# Patient Record
Sex: MALE | Race: WHITE | NOT HISPANIC OR LATINO | Employment: OTHER | URBAN - METROPOLITAN AREA
[De-identification: names, ages, dates, MRNs, and addresses within clinical notes are randomized per-mention and may not be internally consistent; named-entity substitution may affect disease eponyms.]

---

## 2018-08-10 ENCOUNTER — TELEPHONE (OUTPATIENT)
Dept: UROLOGY | Facility: AMBULATORY SURGERY CENTER | Age: 68
End: 2018-08-10

## 2018-08-10 NOTE — TELEPHONE ENCOUNTER
Reason for appointment/Complaint/Diagnosis : possible kidney stones    Insurance: Medicare & medicaid     History of Cancer? nursing home does not know     Previous urologist?     unknown                   Records requested/where? Patient is at [facility], they will fax over     Outside testing/where? Location Preference for office visit?  [facility]    attention to Shwetha: fax number to [phone]

## 2018-09-10 ENCOUNTER — OFFICE VISIT (OUTPATIENT)
Dept: UROLOGY | Facility: AMBULATORY SURGERY CENTER | Age: 68
End: 2018-09-10
Payer: MEDICARE

## 2018-09-10 DIAGNOSIS — N20.0 CALCULUS OF KIDNEY: Primary | ICD-10-CM

## 2018-09-10 PROCEDURE — 99204 OFFICE O/P NEW MOD 45 MIN: CPT | Performed by: UROLOGY

## 2018-09-10 RX ORDER — LISINOPRIL 20 MG/1
20 TABLET ORAL DAILY
COMMUNITY

## 2018-09-10 RX ORDER — VENLAFAXINE 75 MG/1
75 TABLET ORAL 2 TIMES DAILY
COMMUNITY

## 2018-09-10 RX ORDER — TAMSULOSIN HYDROCHLORIDE 0.4 MG/1
0.4 CAPSULE ORAL
COMMUNITY

## 2018-09-10 RX ORDER — BISACODYL 10 MG
10 SUPPOSITORY, RECTAL RECTAL EVERY 24 HOURS
COMMUNITY

## 2018-09-10 RX ORDER — TOPIRAMATE 25 MG/1
25 TABLET ORAL DAILY
COMMUNITY

## 2018-09-10 RX ORDER — CARVEDILOL 12.5 MG/1
12.5 TABLET ORAL 2 TIMES DAILY WITH MEALS
COMMUNITY

## 2018-09-10 RX ORDER — RISPERIDONE 0.5 MG/1
0.5 TABLET, FILM COATED ORAL DAILY
COMMUNITY

## 2018-09-10 RX ORDER — POTASSIUM CHLORIDE 20 MEQ/1
20 TABLET, EXTENDED RELEASE ORAL
COMMUNITY

## 2018-09-10 NOTE — LETTER
September 10, 2018     [facility]    Patient: Leida Collier   YOB: 1950   Date of Visit: 9/10/2018       Dear Dr Uri Martel: Thank you for referring Leida Collier to me for evaluation  Below are my notes for this consultation  If you have questions, please do not hesitate to call me  I look forward to following your patient along with you  Sincerely,        Shawna Domingo MD        CC: No Recipients  Shawna Domingo MD  9/10/2018  8:56 AM  Sign at close encounter  Assessment/Plan:    Calculus of kidney  The patient has old imaging and is a poor historian  I will order a renal stone protocol CT to evaluate for possible kidney stones that need treatment  He will follow up for these results  We will also check a postvoid residual at that time to make sure he is not in overflow incontinence  Diagnoses and all orders for this visit:    Calculus of kidney  -     CT renal stone study abdomen pelvis wo contrast; Future    Other orders  -     bisacodyl (DULCOLAX) 10 mg suppository; Insert 10 mg into the rectum every 24 hours  -     carvedilol (COREG) 12 5 mg tablet; Take 12 5 mg by mouth  -     Cyanocobalamin 1000 MCG/ML KIT; Inject 1,000 mcg into a muscle  -     Glecaprevir-Pibrentasvir 100-40 MG TABS; Take 3 tablets by mouth  -     lisinopril (ZESTRIL) 20 mg tablet; Take 20 mg by mouth  -     magnesium hydroxide (MILK OF MAGNESIA) 400 mg/5 mL oral suspension; Take 30 mL by mouth daily  -     potassium chloride (K-DUR,KLOR-CON) 20 mEq tablet; Take 20 mEq by mouth  -     risperiDONE (RisperDAL) 0 5 mg tablet; Take 0 5 mg by mouth  -     tamsulosin (FLOMAX) 0 4 mg; Take 0 4 mg by mouth  -     topiramate (TOPAMAX) 25 mg tablet; Take 25 mg by mouth  -     venlafaxine (EFFEXOR) 75 mg tablet; Take 75 mg by mouth          Total visit time was 60 minutes of which over 50% was spent on counseling      Subjective:     Patient ID: Leida Collier is a 76 y o  male    51-year-old male who was severely demented presents with an aid for evaluation of possible kidney stones  The patient is a very poor historian but denies any abdominal pain or problems with urination  He he is completely incontinent of urine  Unfortunately the aid does not know him well  The patient had a ultrasound of the abdomen in 2017 that demonstrated a possible large right stone  All imaging was done at an outside facility  There is also a question of hydronephrosis on the left from an ultrasound in 2016  The following portions of the patient's history were reviewed and updated as appropriate: allergies, current medications, past family history, past medical history, past social history, past surgical history and problem list     Review of Systems   Unable to perform ROS: Dementia           Urinary Incontinence Screening      Most Recent Value   Urinary Incontinence   Urinary Incontinence? Yes [Wears pads all day ]          Objective:    Physical Exam   Constitutional: He is oriented to person, place, and time  He appears well-developed and well-nourished  Neck: Normal range of motion  Cardiovascular: Intact distal pulses  Pulmonary/Chest: Effort normal    Abdominal: Soft  Bowel sounds are normal  He exhibits no distension and no mass  There is no tenderness  There is no rebound and no guarding  Musculoskeletal: Normal range of motion  Neurological: He is alert and oriented to person, place, and time  Skin: Skin is warm and dry  Psychiatric: He has a normal mood and affect  Vitals reviewed          Results  No results found for: PSA  No results found for: GLUCOSE, CALCIUM, NA, K, CO2, CL, BUN, CREATININE  No results found for: WBC, HGB, HCT, MCV, PLT    No results found for this or any previous visit (from the past 1 hour(s)) ]

## 2018-09-10 NOTE — PROGRESS NOTES
Assessment/Plan:    Calculus of kidney  The patient has old imaging and is a poor historian  I will order a renal stone protocol CT to evaluate for possible kidney stones that need treatment  He will follow up for these results  We will also check a postvoid residual at that time to make sure he is not in overflow incontinence  Diagnoses and all orders for this visit:    Calculus of kidney  -     CT renal stone study abdomen pelvis wo contrast; Future    Other orders  -     bisacodyl (DULCOLAX) 10 mg suppository; Insert 10 mg into the rectum every 24 hours  -     carvedilol (COREG) 12 5 mg tablet; Take 12 5 mg by mouth  -     Cyanocobalamin 1000 MCG/ML KIT; Inject 1,000 mcg into a muscle  -     Glecaprevir-Pibrentasvir 100-40 MG TABS; Take 3 tablets by mouth  -     lisinopril (ZESTRIL) 20 mg tablet; Take 20 mg by mouth  -     magnesium hydroxide (MILK OF MAGNESIA) 400 mg/5 mL oral suspension; Take 30 mL by mouth daily  -     potassium chloride (K-DUR,KLOR-CON) 20 mEq tablet; Take 20 mEq by mouth  -     risperiDONE (RisperDAL) 0 5 mg tablet; Take 0 5 mg by mouth  -     tamsulosin (FLOMAX) 0 4 mg; Take 0 4 mg by mouth  -     topiramate (TOPAMAX) 25 mg tablet; Take 25 mg by mouth  -     venlafaxine (EFFEXOR) 75 mg tablet; Take 75 mg by mouth          Total visit time was 60 minutes of which over 50% was spent on counseling  Subjective:     Patient ID: Gail Burt is a 76 y o  male    44-year-old male who was severely demented presents with an aid for evaluation of possible kidney stones  The patient is a very poor historian but denies any abdominal pain or problems with urination  He he is completely incontinent of urine  Unfortunately the aid does not know him well  The patient had a ultrasound of the abdomen in 2017 that demonstrated a possible large right stone  All imaging was done at an outside facility  There is also a question of hydronephrosis on the left from an ultrasound in 2016  The following portions of the patient's history were reviewed and updated as appropriate: allergies, current medications, past family history, past medical history, past social history, past surgical history and problem list     Review of Systems   Unable to perform ROS: Dementia           Urinary Incontinence Screening      Most Recent Value   Urinary Incontinence   Urinary Incontinence? Yes [Wears pads all day ]          Objective:    Physical Exam   Constitutional: He is oriented to person, place, and time  He appears well-developed and well-nourished  Neck: Normal range of motion  Cardiovascular: Intact distal pulses  Pulmonary/Chest: Effort normal    Abdominal: Soft  Bowel sounds are normal  He exhibits no distension and no mass  There is no tenderness  There is no rebound and no guarding  Musculoskeletal: Normal range of motion  Neurological: He is alert and oriented to person, place, and time  Skin: Skin is warm and dry  Psychiatric: He has a normal mood and affect  Vitals reviewed          Results  No results found for: PSA  No results found for: GLUCOSE, CALCIUM, NA, K, CO2, CL, BUN, CREATININE  No results found for: WBC, HGB, HCT, MCV, PLT    No results found for this or any previous visit (from the past 1 hour(s)) ]

## 2018-09-26 ENCOUNTER — HOSPITAL ENCOUNTER (OUTPATIENT)
Dept: RADIOLOGY | Facility: HOSPITAL | Age: 68
Discharge: HOME | End: 2018-09-26
Attending: UROLOGY
Payer: MEDICARE

## 2018-09-26 DIAGNOSIS — N20.0 CALCULUS OF KIDNEY: ICD-10-CM

## 2018-09-26 PROCEDURE — 74176 CT ABD & PELVIS W/O CONTRAST: CPT

## 2018-09-27 ENCOUNTER — TELEPHONE (OUTPATIENT)
Dept: UROLOGY | Facility: AMBULATORY SURGERY CENTER | Age: 68
End: 2018-09-27

## 2018-10-01 ENCOUNTER — PREP FOR PROCEDURE (OUTPATIENT)
Dept: UROLOGY | Facility: HOSPITAL | Age: 68
End: 2018-10-01

## 2018-10-01 ENCOUNTER — TELEPHONE (OUTPATIENT)
Dept: UROLOGY | Facility: HOSPITAL | Age: 68
End: 2018-10-01

## 2018-10-01 DIAGNOSIS — N20.0 NEPHROLITHIASIS: Primary | ICD-10-CM

## 2018-10-01 NOTE — TELEPHONE ENCOUNTER
----- Message from Mary Anne Joseph PA-C sent at 9/27/2018  2:39 PM EDT -----   Radiology called me with this patient's CT scan  I guess because he has a follow-up with me  Please review he has a gigantic stone in his left ureter

## 2018-10-01 NOTE — TELEPHONE ENCOUNTER
Dr Moira Irving is entering an order for a #5   Howard Suh is seeing Kaitlin Humphries 10/22 - that can be for his H&p

## 2018-10-02 PROBLEM — N20.0 NEPHROLITHIASIS: Status: ACTIVE | Noted: 2018-10-02

## 2018-10-22 ENCOUNTER — OFFICE VISIT (OUTPATIENT)
Dept: UROLOGY | Facility: CLINIC | Age: 68
End: 2018-10-22
Payer: MEDICARE

## 2018-10-22 VITALS — WEIGHT: 125 LBS

## 2018-10-22 DIAGNOSIS — N20.0 CALCULUS OF KIDNEY: Primary | ICD-10-CM

## 2018-10-22 LAB — POST-VOID RESIDUAL VOLUME, ML POC: 105 ML

## 2018-10-22 PROCEDURE — 99213 OFFICE O/P EST LOW 20 MIN: CPT | Performed by: PHYSICIAN ASSISTANT

## 2018-10-22 PROCEDURE — 51798 US URINE CAPACITY MEASURE: CPT | Performed by: PHYSICIAN ASSISTANT

## 2018-10-22 RX ORDER — MULTIVITAMIN
1 CAPSULE ORAL DAILY
COMMUNITY

## 2018-10-22 RX ORDER — MELATONIN
5000 DAILY
COMMUNITY

## 2018-10-22 RX ORDER — ACETAMINOPHEN 325 MG/1
650 TABLET ORAL EVERY 6 HOURS PRN
COMMUNITY

## 2018-10-22 NOTE — PROGRESS NOTES
UROLOGY PROGRESS NOTE   Patient Identifiers: Heron Cotto (MRN [id number])  Date of Service: 10/22/2018    Subjective:     80-year-old demented male with history kidney stones  Follow-up CT showed a large 18 mm mid ureteral calculi with hydronephrosis  He is totally asymptomatic  He is unable to give a urine sample  Random residual volume was 105 mL  He lives in a memory care unit and is accompanied by a caregiver  Patient has  no complaints        Objective:     VITALS:    Vitals:           LABS:  No results found for: HGB, HCT, WBC, PLT]    No results found for: NA, K, CL, CO2, BUN, CREATININE, CALCIUM, GLUCOSE]        INPATIENT MEDS:    Current Outpatient Prescriptions:     acetaminophen (TYLENOL) 325 mg tablet, Take 650 mg by mouth every 6 (six) hours as needed for mild pain, Disp: , Rfl:     bisacodyl (DULCOLAX) 10 mg suppository, Insert 10 mg into the rectum every 24 hours, Disp: , Rfl:     carvedilol (COREG) 12 5 mg tablet, Take 12 5 mg by mouth 2 (two) times a day with meals  , Disp: , Rfl:     cholecalciferol (VITAMIN D3) 1,000 units tablet, Take 5,000 Units by mouth daily, Disp: , Rfl:     Cyanocobalamin 1000 MCG/ML KIT, Inject 1,000 mcg into a muscle, Disp: , Rfl:     lisinopril (ZESTRIL) 20 mg tablet, Take 20 mg by mouth, Disp: , Rfl:     magnesium hydroxide (MILK OF MAGNESIA) 400 mg/5 mL oral suspension, Take 30 mL by mouth daily, Disp: , Rfl:     Multiple Vitamin (MULTIVITAMIN) capsule, Take 1 capsule by mouth daily, Disp: , Rfl:     olsalazine (DIPENTUM) 250 MG capsule, Take 500 mg by mouth 2 (two) times a day, Disp: , Rfl:     potassium chloride (K-DUR,KLOR-CON) 20 mEq tablet, Take 20 mEq by mouth, Disp: , Rfl:     risperiDONE (RisperDAL) 0 5 mg tablet, Take 0 5 mg by mouth, Disp: , Rfl:     tamsulosin (FLOMAX) 0 4 mg, Take 0 4 mg by mouth, Disp: , Rfl:     topiramate (TOPAMAX) 25 mg tablet, Take 25 mg by mouth, Disp: , Rfl:     venlafaxine (EFFEXOR) 75 mg tablet, Take 75 mg by mouth, Disp: , Rfl:       Physical Exam:   Wt 56 7 kg (125 lb)   GEN: no acute distress    RESP: breathing comfortably with no accessory muscle use    ABD: soft, non-tender, non-distended   INCISION:    EXT: no significant peripheral edema     RADIOLOGY:     CT reviewed and copied into H&P     Assessment:    1   Left ureteral calculi with hydronephrosis     Plan:   - ureteroscopy in its laser lithotripsy scheduled with Dr Alvah Gosselin  -  -  -

## 2018-10-31 ENCOUNTER — TELEPHONE (OUTPATIENT)
Dept: UROLOGY | Facility: AMBULATORY SURGERY CENTER | Age: 68
End: 2018-10-31

## 2018-10-31 NOTE — TELEPHONE ENCOUNTER
10/31/18 Scripts for Urine culture and EKG for procedure with Dr Raul Negron 11/7/18 @ [facility] OR sent to [facility] [place] (fax: [phone])  I spoke to Juan Mccarty @ facility and she confirmed the scripts had been received and testing will be completed

## 2018-11-01 ENCOUNTER — TELEPHONE (OUTPATIENT)
Dept: UROLOGY | Facility: AMBULATORY SURGERY CENTER | Age: 68
End: 2018-11-01

## 2018-11-01 DIAGNOSIS — N20.0 NEPHROLITHIASIS: Primary | ICD-10-CM

## 2018-11-01 NOTE — TELEPHONE ENCOUNTER
Patient has stone surgery 11/7, scheduled for f/u 12/26 @ 1030 with Shahriar MATTHEWS ordered for 1 week prior

## 2018-11-05 NOTE — TELEPHONE ENCOUNTER
Spoke to [facility] @ [facility] 11/5/18@ 8:22am- Urine culture and EKG were completed she will fax results to me today

## 2018-11-07 ENCOUNTER — ANESTHESIA (OUTPATIENT)
Dept: PERIOP | Facility: HOSPITAL | Age: 68
End: 2018-11-07
Payer: MEDICARE

## 2018-11-07 ENCOUNTER — ANESTHESIA EVENT (OUTPATIENT)
Dept: PERIOP | Facility: HOSPITAL | Age: 68
End: 2018-11-07
Payer: MEDICARE

## 2018-11-07 ENCOUNTER — HOSPITAL ENCOUNTER (OUTPATIENT)
Facility: HOSPITAL | Age: 68
Setting detail: OUTPATIENT SURGERY
Discharge: NURSING, REHAB OR LONG-TERM CARE | End: 2018-11-07
Attending: UROLOGY | Admitting: UROLOGY
Payer: MEDICARE

## 2018-11-07 ENCOUNTER — APPOINTMENT (OUTPATIENT)
Dept: RADIOLOGY | Facility: HOSPITAL | Age: 68
End: 2018-11-07
Payer: MEDICARE

## 2018-11-07 VITALS
TEMPERATURE: 97.6 F | DIASTOLIC BLOOD PRESSURE: 93 MMHG | HEIGHT: 64 IN | SYSTOLIC BLOOD PRESSURE: 192 MMHG | HEART RATE: 86 BPM | WEIGHT: 121 LBS | RESPIRATION RATE: 18 BRPM | BODY MASS INDEX: 20.66 KG/M2 | OXYGEN SATURATION: 98 %

## 2018-11-07 DIAGNOSIS — N20.0 NEPHROLITHIASIS: Primary | ICD-10-CM

## 2018-11-07 PROCEDURE — 52356 CYSTO/URETERO W/LITHOTRIPSY: CPT | Performed by: UROLOGY

## 2018-11-07 PROCEDURE — C1758 CATHETER, URETERAL: HCPCS | Performed by: UROLOGY

## 2018-11-07 PROCEDURE — C1769 GUIDE WIRE: HCPCS | Performed by: UROLOGY

## 2018-11-07 PROCEDURE — C2617 STENT, NON-COR, TEM W/O DEL: HCPCS | Performed by: UROLOGY

## 2018-11-07 PROCEDURE — 74420 UROGRAPHY RTRGR +-KUB: CPT

## 2018-11-07 DEVICE — STENT URETERAL 6 FR 26CM INLAY OPTIMA: Type: IMPLANTABLE DEVICE | Site: URETER | Status: FUNCTIONAL

## 2018-11-07 RX ORDER — LABETALOL HYDROCHLORIDE 5 MG/ML
10 INJECTION, SOLUTION INTRAVENOUS ONCE
Status: COMPLETED | OUTPATIENT
Start: 2018-11-07 | End: 2018-11-07

## 2018-11-07 RX ORDER — CIPROFLOXACIN 500 MG/1
500 TABLET, FILM COATED ORAL 2 TIMES DAILY
Qty: 6 TABLET | Refills: 0 | Status: SHIPPED | OUTPATIENT
Start: 2018-11-07 | End: 2018-11-10

## 2018-11-07 RX ORDER — HYDROCODONE BITARTRATE AND ACETAMINOPHEN 5; 325 MG/1; MG/1
1 TABLET ORAL EVERY 4 HOURS PRN
Status: DISCONTINUED | OUTPATIENT
Start: 2018-11-07 | End: 2018-11-07 | Stop reason: HOSPADM

## 2018-11-07 RX ORDER — TRAMADOL HYDROCHLORIDE 50 MG/1
50 TABLET ORAL EVERY 6 HOURS PRN
Qty: 20 TABLET | Refills: 0 | Status: SHIPPED | OUTPATIENT
Start: 2018-11-07 | End: 2018-11-17

## 2018-11-07 RX ORDER — SODIUM CHLORIDE 9 MG/ML
INJECTION, SOLUTION INTRAVENOUS CONTINUOUS PRN
Status: DISCONTINUED | OUTPATIENT
Start: 2018-11-07 | End: 2018-11-07 | Stop reason: SURG

## 2018-11-07 RX ORDER — HYDRALAZINE HYDROCHLORIDE 20 MG/ML
10 INJECTION INTRAMUSCULAR; INTRAVENOUS ONCE
Status: COMPLETED | OUTPATIENT
Start: 2018-11-07 | End: 2018-11-07

## 2018-11-07 RX ORDER — FENTANYL CITRATE 50 UG/ML
INJECTION, SOLUTION INTRAMUSCULAR; INTRAVENOUS AS NEEDED
Status: DISCONTINUED | OUTPATIENT
Start: 2018-11-07 | End: 2018-11-07 | Stop reason: SURG

## 2018-11-07 RX ORDER — FENTANYL CITRATE/PF 50 MCG/ML
12.5 SYRINGE (ML) INJECTION
Status: DISCONTINUED | OUTPATIENT
Start: 2018-11-07 | End: 2018-11-07 | Stop reason: HOSPADM

## 2018-11-07 RX ORDER — PROPOFOL 10 MG/ML
INJECTION, EMULSION INTRAVENOUS AS NEEDED
Status: DISCONTINUED | OUTPATIENT
Start: 2018-11-07 | End: 2018-11-07 | Stop reason: SURG

## 2018-11-07 RX ORDER — ONDANSETRON 2 MG/ML
4 INJECTION INTRAMUSCULAR; INTRAVENOUS ONCE AS NEEDED
Status: DISCONTINUED | OUTPATIENT
Start: 2018-11-07 | End: 2018-11-07 | Stop reason: HOSPADM

## 2018-11-07 RX ORDER — METOPROLOL TARTRATE 5 MG/5ML
INJECTION INTRAVENOUS AS NEEDED
Status: DISCONTINUED | OUTPATIENT
Start: 2018-11-07 | End: 2018-11-07 | Stop reason: SURG

## 2018-11-07 RX ORDER — GLYCOPYRROLATE 0.2 MG/ML
INJECTION INTRAMUSCULAR; INTRAVENOUS AS NEEDED
Status: DISCONTINUED | OUTPATIENT
Start: 2018-11-07 | End: 2018-11-07 | Stop reason: SURG

## 2018-11-07 RX ORDER — ONDANSETRON 2 MG/ML
INJECTION INTRAMUSCULAR; INTRAVENOUS AS NEEDED
Status: DISCONTINUED | OUTPATIENT
Start: 2018-11-07 | End: 2018-11-07 | Stop reason: SURG

## 2018-11-07 RX ORDER — MAGNESIUM HYDROXIDE 1200 MG/15ML
LIQUID ORAL AS NEEDED
Status: DISCONTINUED | OUTPATIENT
Start: 2018-11-07 | End: 2018-11-07 | Stop reason: HOSPADM

## 2018-11-07 RX ORDER — LIDOCAINE HYDROCHLORIDE 10 MG/ML
INJECTION, SOLUTION INFILTRATION; PERINEURAL AS NEEDED
Status: DISCONTINUED | OUTPATIENT
Start: 2018-11-07 | End: 2018-11-07 | Stop reason: SURG

## 2018-11-07 RX ADMIN — LABETALOL 20 MG/4 ML (5 MG/ML) INTRAVENOUS SYRINGE 10 MG: at 12:03

## 2018-11-07 RX ADMIN — GLYCOPYRROLATE 0.1 MG: 0.2 INJECTION, SOLUTION INTRAMUSCULAR; INTRAVENOUS at 09:24

## 2018-11-07 RX ADMIN — FENTANYL CITRATE 25 MCG: 50 INJECTION INTRAMUSCULAR; INTRAVENOUS at 09:56

## 2018-11-07 RX ADMIN — METOPROLOL TARTRATE 2.5 MG: 1 INJECTION, SOLUTION INTRAVENOUS at 09:58

## 2018-11-07 RX ADMIN — FENTANYL CITRATE 25 MCG: 50 INJECTION INTRAMUSCULAR; INTRAVENOUS at 09:39

## 2018-11-07 RX ADMIN — SODIUM CHLORIDE: 0.9 INJECTION, SOLUTION INTRAVENOUS at 08:19

## 2018-11-07 RX ADMIN — PROPOFOL 50 MG: 10 INJECTION, EMULSION INTRAVENOUS at 09:23

## 2018-11-07 RX ADMIN — DEXAMETHASONE SODIUM PHOSPHATE 10 MG: 10 INJECTION INTRAMUSCULAR; INTRAVENOUS at 09:23

## 2018-11-07 RX ADMIN — LIDOCAINE HYDROCHLORIDE 50 MG: 10 INJECTION, SOLUTION INFILTRATION; PERINEURAL at 09:22

## 2018-11-07 RX ADMIN — ONDANSETRON 4 MG: 2 INJECTION INTRAMUSCULAR; INTRAVENOUS at 09:23

## 2018-11-07 RX ADMIN — FENTANYL CITRATE 25 MCG: 50 INJECTION INTRAMUSCULAR; INTRAVENOUS at 09:45

## 2018-11-07 RX ADMIN — CEFAZOLIN SODIUM 2000 MG: 2 SOLUTION INTRAVENOUS at 09:15

## 2018-11-07 RX ADMIN — FENTANYL CITRATE 25 MCG: 50 INJECTION INTRAMUSCULAR; INTRAVENOUS at 09:29

## 2018-11-07 RX ADMIN — METOPROLOL TARTRATE 2.5 MG: 1 INJECTION, SOLUTION INTRAVENOUS at 09:47

## 2018-11-07 RX ADMIN — PROPOFOL 100 MG: 10 INJECTION, EMULSION INTRAVENOUS at 09:22

## 2018-11-07 RX ADMIN — HYDRALAZINE HYDROCHLORIDE 10 MG: 20 INJECTION INTRAMUSCULAR; INTRAVENOUS at 12:34

## 2018-11-07 NOTE — ANESTHESIA PREPROCEDURE EVALUATION
Review of Systems/Medical History          Cardiovascular  No pacemaker/AICD, Hypertension , No cardiac stents     Pulmonary  Not a smoker , No asthma , No recent URI ,        GI/Hepatic    Liver disease , Hepatitis C,        Kidney stones,        Endo/Other     GYN       Hematology   Musculoskeletal       Neurology  Negative neurology ROS      Psychology     No dementia           Physical Exam    Airway    Mallampati score: II  TM Distance: >3 FB       Dental       Cardiovascular      Pulmonary      Other Findings    No results found for: WBC, HGB, PLT  No results found for: NA, K, BUN, CREATININE, GLUCOSE  No results found for: PTT   No results found for: INR      No results found for: HGBA1C      Anesthesia Plan  ASA Score- 3     Anesthesia Type- general with ASA Monitors  Additional Monitors:   Airway Plan: LMA  Comment:  JACINTO Harmon , have personally seen and evaluated the patient prior to anesthetic care  I have reviewed the pre-anesthetic record, and other medical records if appropriate to the anesthetic care  If a CRNA is involved in the case, I have reviewed the CRNA assessment, if present, and agree  Risks/benefits and alternatives discussed with patient including possible PONV, sore throat, and possibility of rare anesthetic and surgical emergencies        Plan Factors- Patient instructed to abstain from smoking on day of procedure  Patient did not smoke on day of surgery  Induction- intravenous  Postoperative Plan- Plan for postoperative opioid use  Planned trial extubation    Informed Consent- Anesthetic plan and risks discussed with patient  I personally reviewed this patient with the CRNA  Discussed and agreed on the Anesthesia Plan with the ANIRUDH Nguyen

## 2018-11-07 NOTE — DISCHARGE INSTRUCTIONS
CYSTOSCOPY, TURBT, PROSTATE BIOPSY, BLADDER BIOPSY   DISCHARGE INSTRUCTIONS    Care after your surgery:  1  You may have burning or red colored urine the first 24 hours  Your burning should  stop in 24 hours and your urine should clear in 24-48 hours  2  You should drink more fluids unless Dr Olamide King advises you not to    3  You should return to normal activities when your urine is clear again  4  You may have a small amount of red drainage from your rectum if you had a prostate  biopsy performed  This should stop in 24 hours  5  Take pain medication as prescribed by your doctor  Call Dr Olamide King if you have any of the followin  You can not urinate or you have active or persistent bleeding, clots, back pain, or  pain when you urinate  2  You have chills, fever above 101 degrees, or feel sick  3  You have persistent nausea or vomiting, persistent dizziness, or lightheadedness  4  Call Dr Olamide King if you have any questions or concerns about your procedure at:  [phone]      Follow-up with Dr Olamide King in 1-2 weeks

## 2018-11-07 NOTE — ANESTHESIA POSTPROCEDURE EVALUATION
Post-Op Assessment Note      CV Status:  Stable    Mental Status:  Somnolent    Hydration Status:  Stable    PONV Controlled:  None    Airway Patency:  Patent        Staff: CRNA           BP  159/70   Temp   97 9   Pulse  66   Resp   16   SpO2   100

## 2018-11-07 NOTE — PERIOPERATIVE NURSING NOTE
Dr Sergo Gracia made aware of repeat blood pressure results    [facility] for discharge

## 2018-11-07 NOTE — H&P (VIEW-ONLY)
UROLOGY PROGRESS NOTE   Patient Identifiers: Geovanni Damon (MRN [id number])  Date of Service: 10/22/2018    Subjective:     68-year-old demented male with history kidney stones  Follow-up CT showed a large 18 mm mid ureteral calculi with hydronephrosis  He is totally asymptomatic  He is unable to give a urine sample  Random residual volume was 105 mL  He lives in a memory care unit and is accompanied by a caregiver  Patient has  no complaints        Objective:     VITALS:    Vitals:           LABS:  No results found for: HGB, HCT, WBC, PLT]    No results found for: NA, K, CL, CO2, BUN, CREATININE, CALCIUM, GLUCOSE]        INPATIENT MEDS:    Current Outpatient Prescriptions:     acetaminophen (TYLENOL) 325 mg tablet, Take 650 mg by mouth every 6 (six) hours as needed for mild pain, Disp: , Rfl:     bisacodyl (DULCOLAX) 10 mg suppository, Insert 10 mg into the rectum every 24 hours, Disp: , Rfl:     carvedilol (COREG) 12 5 mg tablet, Take 12 5 mg by mouth 2 (two) times a day with meals  , Disp: , Rfl:     cholecalciferol (VITAMIN D3) 1,000 units tablet, Take 5,000 Units by mouth daily, Disp: , Rfl:     Cyanocobalamin 1000 MCG/ML KIT, Inject 1,000 mcg into a muscle, Disp: , Rfl:     lisinopril (ZESTRIL) 20 mg tablet, Take 20 mg by mouth, Disp: , Rfl:     magnesium hydroxide (MILK OF MAGNESIA) 400 mg/5 mL oral suspension, Take 30 mL by mouth daily, Disp: , Rfl:     Multiple Vitamin (MULTIVITAMIN) capsule, Take 1 capsule by mouth daily, Disp: , Rfl:     olsalazine (DIPENTUM) 250 MG capsule, Take 500 mg by mouth 2 (two) times a day, Disp: , Rfl:     potassium chloride (K-DUR,KLOR-CON) 20 mEq tablet, Take 20 mEq by mouth, Disp: , Rfl:     risperiDONE (RisperDAL) 0 5 mg tablet, Take 0 5 mg by mouth, Disp: , Rfl:     tamsulosin (FLOMAX) 0 4 mg, Take 0 4 mg by mouth, Disp: , Rfl:     topiramate (TOPAMAX) 25 mg tablet, Take 25 mg by mouth, Disp: , Rfl:     venlafaxine (EFFEXOR) 75 mg tablet, Take 75 mg by mouth, Disp: , Rfl:       Physical Exam:   Wt 56 7 kg (125 lb)   GEN: no acute distress    RESP: breathing comfortably with no accessory muscle use    ABD: soft, non-tender, non-distended   INCISION:    EXT: no significant peripheral edema     RADIOLOGY:     CT reviewed and copied into H&P     Assessment:    1   Left ureteral calculi with hydronephrosis     Plan:   - ureteroscopy in its laser lithotripsy scheduled with Dr Freida Cardenas  -  -  -

## 2018-11-07 NOTE — OP NOTE
OPERATIVE REPORT  PATIENT NAME: Kelly Ramirez    :  1950  MRN: [id number]  Pt Location: AN OR ROOM 02    SURGERY DATE: 2018    Surgeon(s) and Role:     Ayaz Jean MD - Primary    Preop Diagnosis:  Nephrolithiasis [N20 0]    Post-Op Diagnosis Codes:     * Nephrolithiasis [N20 0]    Procedure(s) (LRB):  CYSTOSCOPY URETEROSCOPY WITH LITHOTRIPSY HOLMIUM LASER, RETROGRADE PYELOGRAM AND INSERTION STENT URETERAL (Left)    Specimen(s):  * No specimens in log *    Estimated Blood Loss:   Minimal    Drains:       Anesthesia Type:   General    Operative Indications:  Nephrolithiasis [N20 0]      Operative Findings:  3 cm stone in the proximal left ureter that was fragmented with the laser  No urothelial lesions noted  Complications:   None    Procedure and Technique:  After informed consent was obtained the patient was brought to the operating room  Preoperative antibiotics were given for infection prophylaxis  Bilateral sequential compressive devices were placed on the lower extremities for DVT prophylaxis  A timeout was performed to identify the patient, surgery to be performed, and correct laterality  The patient was then prepped and draped in standard sterile fashion in the dorsal lithotomy position  A 22 [place]uran rigid cystoscope was inserted per urethra and into the bladder  A diagnostic cystoscopy was performed that demonstrated no urothelial lesions  The left Ureteral orifice was identified and cannulated with a sensor guidewire up to the level of the renal pelvis under fluoroscopic guidance  The cystoscope was removed and a semirigid ureteroscope was then advanced up the ureter alongside the wire until the stone was encountered at the proximal ureter  The stone was fragmented with a holmium laser  The fragments did move up into the kidney  Once all the large fragments had been broken down or flushed up into the kidney the semi rigid scope was removed    I then attempted to do flexible ureteroscopy but was unable to get the flexible cystoscope back up the ureter and into the kidney  At this time we decided to proceed with stent placement  A 5 Frisian open-ended ureteral catheter was then placed over the remaining guidewire and a retrograde pyelogram was performed that demonstrated severe hydronephrosis and no filling defects  The wire was then replaced and the open-ended ureteral catheter was removed  A [id number] stent was placed under both visual and fluoroscopic guidance over the wire  Image of the stent coiled within the renal pelvis and bladder were saved for the medical record  No string was left on the stent  The patient was awoken and taken to the postanesthesia care unit in stable condition           I was present for the entire procedure    Patient Disposition:  PACU     SIGNATURE: Emmanuel Arias MD  DATE: November 7, 2018  TIME: 10:52 AM

## 2018-12-26 ENCOUNTER — OFFICE VISIT (OUTPATIENT)
Dept: UROLOGY | Facility: CLINIC | Age: 68
End: 2018-12-26
Payer: MEDICARE

## 2018-12-26 VITALS
WEIGHT: 126 LBS | HEIGHT: 65 IN | HEART RATE: 65 BPM | SYSTOLIC BLOOD PRESSURE: 142 MMHG | DIASTOLIC BLOOD PRESSURE: 84 MMHG | BODY MASS INDEX: 20.99 KG/M2

## 2018-12-26 DIAGNOSIS — N20.0 NEPHROLITHIASIS: Primary | ICD-10-CM

## 2018-12-26 PROCEDURE — 99213 OFFICE O/P EST LOW 20 MIN: CPT | Performed by: NURSE PRACTITIONER

## 2018-12-26 NOTE — PROGRESS NOTES
12/26/2018    Karon Cornelius  1950  [id number]      Assessment  -Nephrolithiasis s/p left ureteroscopy (11/7/18)    Discussion/Plan  Ghassan Markham is a 76 y o  male being managed by Dr Cherry Abraham        -Patient's caretaker presents today with results of recent KUB which was obtained on 12/21/2018  Results confirm presence of left ureteral stent which remains in place  We discussed patient will require cystoscopy and ureteral stent removal   Unfortunately, patient's caretaker states that this will likely need to be performed under anesthesia, as patient is not compliant and will be unwilling to undergo procedure in office  Will discuss further with Dr Cherry Abraham, and call patient's facility with plan  We will need to obtain procedure consent by his POA  In the interim, he will continue to take tamsulosin 0 4 mg HS  Patient's caretaker was informed to call with any issues  -All questions answered, patient agrees with plan      History of Present Illness  76 y o  male with a history of nephrolithiasis s/p left ureteroscopy (11/7/18) presents today for follow up  Patient's 3 cm stone in the proximal left ureter was well fragmented, and ureteral stent was placed without string  He presents today with caretaker who denies any lower urinary tract symptoms, gross hematuria, or dysuria  She denies any report of flank pain or discomfort  Patient continues to wear a sanitary brief daily for episodes of chronic urinary incontinence, secondary to behavioral compliance  No overall changes to health since last office visit  Review of Systems  Review of Systems   Constitutional: Negative  HENT: Negative  Respiratory: Negative  Cardiovascular: Negative  Gastrointestinal: Negative  Genitourinary: Negative for decreased urine volume, difficulty urinating, dysuria, flank pain, frequency, hematuria and urgency  Musculoskeletal: Negative  Skin: Negative  Psychiatric/Behavioral: Negative  Past Medical History  Past Medical History:   Diagnosis Date    BPH (benign prostatic hyperplasia)     Dementia     Hypertension     Infectious viral hepatitis 2011    Hep C    Irritable bowel syndrome        Past Social History  Past Surgical History:   Procedure Laterality Date    COLON SURGERY      multiple     FL RETROGRADE PYELOGRAM  11/7/2018    DE CYSTO/URETERO W/LITHOTRIPSY &INDWELL STENT INSRT Left 11/7/2018    Procedure: CYSTOSCOPY URETEROSCOPY WITH LITHOTRIPSY HOLMIUM LASER, RETROGRADE PYELOGRAM AND INSERTION STENT URETERAL;  Surgeon: Devante Dave MD;  Location: [facility];  Service: Urology       Past Family History  Family History   Problem Relation Age of Onset    No Known Problems Father     No Known Problems Mother     No Known Problems Maternal Aunt     No Known Problems Maternal Uncle     No Known Problems Paternal Aunt     No Known Problems Paternal Uncle     No Known Problems Paternal Grandmother     No Known Problems Paternal Grandfather     No Known Problems Maternal Grandmother     No Known Problems Maternal Grandfather        Past Social history  Social History     Social History    Marital status:      Spouse name: N/A    Number of children: N/A    Years of education: N/A     Occupational History    Not on file       Social History Main Topics    Smoking status: Former Smoker     Types: Cigarettes     Quit date: 1988    Smokeless tobacco: Never Used    Alcohol use No      Comment: HX of alcolhol abuse    Drug use: No    Sexual activity: Not on file     Other Topics Concern    Not on file     Social History Narrative    No narrative on file       Current Medications  Current Outpatient Prescriptions   Medication Sig Dispense Refill    acetaminophen (TYLENOL) 325 mg tablet Take 650 mg by mouth every 6 (six) hours as needed for mild pain      bisacodyl (DULCOLAX) 10 mg suppository Insert 10 mg into the rectum every 24 hours      carvedilol (COREG) 12 5 mg tablet Take 12 5 mg by mouth 2 (two) times a day with meals        cholecalciferol (VITAMIN D3) 1,000 units tablet Take 5,000 Units by mouth daily      Cyanocobalamin 1000 MCG/ML KIT Inject 1,000 mcg into a muscle Once per month       lisinopril (ZESTRIL) 20 mg tablet Take 20 mg by mouth daily        magnesium hydroxide (MILK OF MAGNESIA) 400 mg/5 mL oral suspension Take 30 mL by mouth daily      Multiple Vitamin (MULTIVITAMIN) capsule Take 1 capsule by mouth daily      olsalazine (DIPENTUM) 250 MG capsule Take 500 mg by mouth 2 (two) times a day      potassium chloride (K-DUR,KLOR-CON) 20 mEq tablet Take 20 mEq by mouth      risperiDONE (RisperDAL) 0 5 mg tablet Take 0 5 mg by mouth daily        tamsulosin (FLOMAX) 0 4 mg Take 0 4 mg by mouth daily with dinner        topiramate (TOPAMAX) 25 mg tablet Take 25 mg by mouth daily        venlafaxine (EFFEXOR) 75 mg tablet Take 75 mg by mouth 2 (two) times a day         No current facility-administered medications for this visit  Allergies  Allergies   Allergen Reactions    Sulfa Antibiotics        Past Medical History, Social History, Family History, medications and allergies were reviewed  Vitals  There were no vitals filed for this visit  Physical Exam  Physical Exam   Constitutional: He appears well-developed and well-nourished  HENT:   Head: Normocephalic  Eyes: Pupils are equal, round, and reactive to light  Neck: Normal range of motion  Cardiovascular: Normal rate and regular rhythm  Pulmonary/Chest: Effort normal    Abdominal: Soft  Normal appearance  He exhibits no distension  There is no tenderness  There is no CVA tenderness  Genitourinary:   Genitourinary Comments: No suprapubic discomfort or distention   Musculoskeletal: Normal range of motion  Neurological: He is alert  Skin: Skin is warm and dry  Psychiatric: He has a normal mood and affect         Results    I have personally reviewed all pertinent lab results and reviewed with patient  No results found for: PSA  No results found for: GLUCOSE, CALCIUM, NA, K, CO2, CL, BUN, CREATININE  No results found for: WBC, HGB, HCT, MCV, PLT  No results found for this or any previous visit (from the past 1 hour(s))

## 2019-01-03 PROBLEM — N20.0 KIDNEY STONE: Status: ACTIVE | Noted: 2019-01-03

## 2019-01-09 ENCOUNTER — TELEPHONE (OUTPATIENT)
Dept: UROLOGY | Facility: AMBULATORY SURGERY CENTER | Age: 69
End: 2019-01-09

## 2019-01-09 NOTE — TELEPHONE ENCOUNTER
Rhiannon Parr Received TC from Maurisio Lambert from [facility] @ [facility] and patient passed away today  He was scheduled for Left # 5 with Dr Nick Kennedy on 02/06/2019   Curtis Singh

## (undated) DEVICE — SKIN MARKER DUAL TIP WITH RULER CAP, FLEXIBLE RULER AND LABELS: Brand: DEVON

## (undated) DEVICE — CATH URET .038 10FR 50CM DUAL LUMEN

## (undated) DEVICE — UROCATCH BAG

## (undated) DEVICE — GLOVE INDICATOR PI UNDERGLOVE SZ 7.5 BLUE

## (undated) DEVICE — PACK TUR

## (undated) DEVICE — STERILE SURGICAL LUBRICANT,  TUBE: Brand: SURGILUBE

## (undated) DEVICE — GUIDEWIRE STRGHT TIP 0.035 IN  SOLO PLUS

## (undated) DEVICE — SCD SEQUENTIAL COMPRESSION COMFORT SLEEVE MEDIUM KNEE LENGTH: Brand: KENDALL SCD

## (undated) DEVICE — LASER FIBER HOLMIUM 272MICRON

## (undated) DEVICE — GLOVE SRG BIOGEL ECLIPSE 7.5